# Patient Record
Sex: MALE | Race: WHITE | Employment: UNEMPLOYED | URBAN - METROPOLITAN AREA
[De-identification: names, ages, dates, MRNs, and addresses within clinical notes are randomized per-mention and may not be internally consistent; named-entity substitution may affect disease eponyms.]

---

## 2021-03-01 ENCOUNTER — HOSPITAL ENCOUNTER (EMERGENCY)
Age: 26
Discharge: HOME | End: 2021-03-01
Payer: MEDICAID

## 2021-03-01 ENCOUNTER — APPOINTMENT (OUTPATIENT)
Dept: GENERAL RADIOLOGY | Age: 26
End: 2021-03-01
Payer: MEDICAID

## 2021-03-01 VITALS
OXYGEN SATURATION: 97 % | HEART RATE: 95 BPM | WEIGHT: 170.86 LBS | BODY MASS INDEX: 23.14 KG/M2 | DIASTOLIC BLOOD PRESSURE: 67 MMHG | SYSTOLIC BLOOD PRESSURE: 101 MMHG | TEMPERATURE: 98.2 F | RESPIRATION RATE: 16 BRPM | HEIGHT: 72 IN

## 2021-03-01 ASSESSMENT — PAIN DESCRIPTION - ORIENTATION: ORIENTATION: LEFT

## 2021-03-01 ASSESSMENT — PAIN SCALES - GENERAL: PAINLEVEL_OUTOF10: 8

## 2021-03-01 ASSESSMENT — PAIN DESCRIPTION - PAIN TYPE: TYPE: ACUTE PAIN

## 2021-03-01 ASSESSMENT — PAIN DESCRIPTION - LOCATION: LOCATION: HAND

## 2021-06-30 ENCOUNTER — HOSPITAL ENCOUNTER (INPATIENT)
Age: 26
LOS: 1 days | Discharge: HOME | DRG: 460 | End: 2021-07-02
Attending: EMERGENCY MEDICINE | Admitting: STUDENT IN AN ORGANIZED HEALTH CARE EDUCATION/TRAINING PROGRAM
Payer: MEDICAID

## 2021-06-30 ENCOUNTER — APPOINTMENT (OUTPATIENT)
Dept: CT IMAGING | Age: 26
DRG: 460 | End: 2021-06-30
Payer: MEDICAID

## 2021-06-30 ENCOUNTER — APPOINTMENT (OUTPATIENT)
Dept: GENERAL RADIOLOGY | Age: 26
DRG: 460 | End: 2021-06-30
Payer: MEDICAID

## 2021-06-30 DIAGNOSIS — E86.0 DEHYDRATION: ICD-10-CM

## 2021-06-30 DIAGNOSIS — R11.2 NON-INTRACTABLE VOMITING WITH NAUSEA, UNSPECIFIED VOMITING TYPE: ICD-10-CM

## 2021-06-30 DIAGNOSIS — N28.9 ACUTE RENAL INSUFFICIENCY: Primary | ICD-10-CM

## 2021-06-30 LAB
BASOPHILS ABSOLUTE: 0.1 K/UL (ref 0–0.2)
BASOPHILS RELATIVE PERCENT: 0.5 %
EOSINOPHILS ABSOLUTE: 0.1 K/UL (ref 0–0.6)
EOSINOPHILS RELATIVE PERCENT: 0.4 %
HCT VFR BLD CALC: 52.7 % (ref 40.5–52.5)
HEMOGLOBIN: 18.4 G/DL (ref 13.5–17.5)
LYMPHOCYTES ABSOLUTE: 1.4 K/UL (ref 1–5.1)
LYMPHOCYTES RELATIVE PERCENT: 6 %
MCH RBC QN AUTO: 27.6 PG (ref 26–34)
MCHC RBC AUTO-ENTMCNC: 34.9 G/DL (ref 31–36)
MCV RBC AUTO: 78.9 FL (ref 80–100)
MONOCYTES ABSOLUTE: 1.2 K/UL (ref 0–1.3)
MONOCYTES RELATIVE PERCENT: 5.2 %
NEUTROPHILS ABSOLUTE: 20.3 K/UL (ref 1.7–7.7)
NEUTROPHILS RELATIVE PERCENT: 87.9 %
PDW BLD-RTO: 14.1 % (ref 12.4–15.4)
PLATELET # BLD: 506 K/UL (ref 135–450)
PMV BLD AUTO: 8.6 FL (ref 5–10.5)
RBC # BLD: 6.68 M/UL (ref 4.2–5.9)
TROPONIN: <0.01 NG/ML
WBC # BLD: 23.1 K/UL (ref 4–11)

## 2021-06-30 PROCEDURE — 71045 X-RAY EXAM CHEST 1 VIEW: CPT

## 2021-06-30 PROCEDURE — 85025 COMPLETE CBC W/AUTO DIFF WBC: CPT

## 2021-06-30 PROCEDURE — 84484 ASSAY OF TROPONIN QUANT: CPT

## 2021-06-30 PROCEDURE — 83690 ASSAY OF LIPASE: CPT

## 2021-06-30 PROCEDURE — 82550 ASSAY OF CK (CPK): CPT

## 2021-06-30 PROCEDURE — 93005 ELECTROCARDIOGRAM TRACING: CPT | Performed by: EMERGENCY MEDICINE

## 2021-06-30 PROCEDURE — 6360000002 HC RX W HCPCS: Performed by: EMERGENCY MEDICINE

## 2021-06-30 PROCEDURE — 83735 ASSAY OF MAGNESIUM: CPT

## 2021-06-30 PROCEDURE — 2580000003 HC RX 258: Performed by: EMERGENCY MEDICINE

## 2021-06-30 PROCEDURE — 81001 URINALYSIS AUTO W/SCOPE: CPT

## 2021-06-30 PROCEDURE — 80053 COMPREHEN METABOLIC PANEL: CPT

## 2021-06-30 PROCEDURE — 96375 TX/PRO/DX INJ NEW DRUG ADDON: CPT

## 2021-06-30 PROCEDURE — 80307 DRUG TEST PRSMV CHEM ANLYZR: CPT

## 2021-06-30 PROCEDURE — 99285 EMERGENCY DEPT VISIT HI MDM: CPT

## 2021-06-30 PROCEDURE — 96361 HYDRATE IV INFUSION ADD-ON: CPT

## 2021-06-30 PROCEDURE — 36415 COLL VENOUS BLD VENIPUNCTURE: CPT

## 2021-06-30 RX ORDER — METOCLOPRAMIDE HYDROCHLORIDE 5 MG/ML
10 INJECTION INTRAMUSCULAR; INTRAVENOUS ONCE
Status: COMPLETED | OUTPATIENT
Start: 2021-07-01 | End: 2021-07-01

## 2021-06-30 RX ORDER — MAGNESIUM SULFATE 1 G/100ML
1000 INJECTION INTRAVENOUS ONCE
Status: COMPLETED | OUTPATIENT
Start: 2021-06-30 | End: 2021-07-01

## 2021-06-30 RX ORDER — 0.9 % SODIUM CHLORIDE 0.9 %
1000 INTRAVENOUS SOLUTION INTRAVENOUS ONCE
Status: COMPLETED | OUTPATIENT
Start: 2021-06-30 | End: 2021-07-01

## 2021-06-30 RX ORDER — ONDANSETRON 2 MG/ML
4 INJECTION INTRAMUSCULAR; INTRAVENOUS ONCE
Status: COMPLETED | OUTPATIENT
Start: 2021-06-30 | End: 2021-06-30

## 2021-06-30 RX ORDER — DIPHENHYDRAMINE HYDROCHLORIDE 50 MG/ML
12.5 INJECTION INTRAMUSCULAR; INTRAVENOUS ONCE
Status: COMPLETED | OUTPATIENT
Start: 2021-07-01 | End: 2021-07-01

## 2021-06-30 RX ADMIN — SODIUM CHLORIDE 1000 ML: 9 INJECTION, SOLUTION INTRAVENOUS at 23:25

## 2021-06-30 RX ADMIN — MAGNESIUM SULFATE HEPTAHYDRATE 1000 MG: 1 INJECTION, SOLUTION INTRAVENOUS at 23:51

## 2021-06-30 RX ADMIN — ONDANSETRON 4 MG: 2 INJECTION INTRAMUSCULAR; INTRAVENOUS at 23:25

## 2021-07-01 ENCOUNTER — APPOINTMENT (OUTPATIENT)
Dept: CT IMAGING | Age: 26
DRG: 460 | End: 2021-07-01
Payer: MEDICAID

## 2021-07-01 PROBLEM — N17.9 ACUTE KIDNEY INJURY (HCC): Status: ACTIVE | Noted: 2021-07-01

## 2021-07-01 LAB
A/G RATIO: 1 (ref 1.1–2.2)
A/G RATIO: 1.1 (ref 1.1–2.2)
ALBUMIN SERPL-MCNC: 4 G/DL (ref 3.4–5)
ALBUMIN SERPL-MCNC: 5.1 G/DL (ref 3.4–5)
ALP BLD-CCNC: 100 U/L (ref 40–129)
ALP BLD-CCNC: 75 U/L (ref 40–129)
ALT SERPL-CCNC: 34 U/L (ref 10–40)
ALT SERPL-CCNC: 45 U/L (ref 10–40)
AMORPHOUS: ABNORMAL /HPF
AMPHETAMINE SCREEN, URINE: POSITIVE
ANION GAP SERPL CALCULATED.3IONS-SCNC: 21 MMOL/L (ref 3–16)
ANION GAP SERPL CALCULATED.3IONS-SCNC: 24 MMOL/L (ref 3–16)
ANION GAP SERPL CALCULATED.3IONS-SCNC: 33 MMOL/L (ref 3–16)
AST SERPL-CCNC: 48 U/L (ref 15–37)
AST SERPL-CCNC: 64 U/L (ref 15–37)
BACTERIA: ABNORMAL /HPF
BARBITURATE SCREEN URINE: ABNORMAL
BASOPHILS ABSOLUTE: 0 K/UL (ref 0–0.2)
BASOPHILS RELATIVE PERCENT: 0.1 %
BENZODIAZEPINE SCREEN, URINE: ABNORMAL
BILIRUB SERPL-MCNC: 0.6 MG/DL (ref 0–1)
BILIRUB SERPL-MCNC: 0.9 MG/DL (ref 0–1)
BILIRUBIN URINE: ABNORMAL
BLOOD, URINE: ABNORMAL
BUN BLDV-MCNC: 77 MG/DL (ref 7–20)
BUN BLDV-MCNC: 79 MG/DL (ref 7–20)
BUN BLDV-MCNC: 80 MG/DL (ref 7–20)
CALCIUM SERPL-MCNC: 10.7 MG/DL (ref 8.3–10.6)
CALCIUM SERPL-MCNC: 8.5 MG/DL (ref 8.3–10.6)
CALCIUM SERPL-MCNC: 8.6 MG/DL (ref 8.3–10.6)
CANNABINOID SCREEN URINE: ABNORMAL
CHLORIDE BLD-SCNC: 68 MMOL/L (ref 99–110)
CHLORIDE BLD-SCNC: 80 MMOL/L (ref 99–110)
CHLORIDE BLD-SCNC: 81 MMOL/L (ref 99–110)
CLARITY: CLEAR
CO2: 25 MMOL/L (ref 21–32)
CO2: 25 MMOL/L (ref 21–32)
CO2: 28 MMOL/L (ref 21–32)
COARSE CASTS, UA: ABNORMAL /LPF (ref 0–2)
COCAINE METABOLITE SCREEN URINE: POSITIVE
COLOR: YELLOW
CREAT SERPL-MCNC: 7.2 MG/DL (ref 0.9–1.3)
CREAT SERPL-MCNC: 7.8 MG/DL (ref 0.9–1.3)
CREAT SERPL-MCNC: 9 MG/DL (ref 0.9–1.3)
EOSINOPHILS ABSOLUTE: 0 K/UL (ref 0–0.6)
EOSINOPHILS RELATIVE PERCENT: 0 %
EPITHELIAL CELLS, UA: ABNORMAL /HPF (ref 0–5)
GFR AFRICAN AMERICAN: 10
GFR AFRICAN AMERICAN: 11
GFR AFRICAN AMERICAN: 9
GFR NON-AFRICAN AMERICAN: 7
GFR NON-AFRICAN AMERICAN: 8
GFR NON-AFRICAN AMERICAN: 9
GLOBULIN: 3.8 G/DL
GLOBULIN: 5.2 G/DL
GLUCOSE BLD-MCNC: 126 MG/DL (ref 70–99)
GLUCOSE BLD-MCNC: 130 MG/DL (ref 70–99)
GLUCOSE BLD-MCNC: 165 MG/DL (ref 70–99)
GLUCOSE URINE: NEGATIVE MG/DL
HCT VFR BLD CALC: 40.5 % (ref 40.5–52.5)
HEMOGLOBIN: 14.6 G/DL (ref 13.5–17.5)
KETONES, URINE: ABNORMAL MG/DL
LEUKOCYTE ESTERASE, URINE: NEGATIVE
LIPASE: 37 U/L (ref 13–60)
LYMPHOCYTES ABSOLUTE: 1.3 K/UL (ref 1–5.1)
LYMPHOCYTES RELATIVE PERCENT: 6.1 %
Lab: ABNORMAL
MAGNESIUM: 2.9 MG/DL (ref 1.8–2.4)
MCH RBC QN AUTO: 28.2 PG (ref 26–34)
MCHC RBC AUTO-ENTMCNC: 35.9 G/DL (ref 31–36)
MCV RBC AUTO: 78.4 FL (ref 80–100)
METHADONE SCREEN, URINE: ABNORMAL
MICROSCOPIC EXAMINATION: YES
MONOCYTES ABSOLUTE: 1.6 K/UL (ref 0–1.3)
MONOCYTES RELATIVE PERCENT: 7.9 %
NEUTROPHILS ABSOLUTE: 17.7 K/UL (ref 1.7–7.7)
NEUTROPHILS RELATIVE PERCENT: 85.9 %
NITRITE, URINE: NEGATIVE
OPIATE SCREEN URINE: ABNORMAL
OXYCODONE URINE: ABNORMAL
PDW BLD-RTO: 13.8 % (ref 12.4–15.4)
PH UA: 5
PH UA: 5 (ref 5–8)
PHENCYCLIDINE SCREEN URINE: ABNORMAL
PLATELET # BLD: 378 K/UL (ref 135–450)
PMV BLD AUTO: 8.5 FL (ref 5–10.5)
POTASSIUM REFLEX MAGNESIUM: 3.5 MMOL/L (ref 3.5–5.1)
POTASSIUM REFLEX MAGNESIUM: 3.8 MMOL/L (ref 3.5–5.1)
POTASSIUM SERPL-SCNC: 3.7 MMOL/L (ref 3.5–5.1)
PROPOXYPHENE SCREEN: ABNORMAL
PROTEIN UA: 30 MG/DL
RBC # BLD: 5.17 M/UL (ref 4.2–5.9)
RBC UA: ABNORMAL /HPF (ref 0–4)
SODIUM BLD-SCNC: 126 MMOL/L (ref 136–145)
SODIUM BLD-SCNC: 129 MMOL/L (ref 136–145)
SODIUM BLD-SCNC: 130 MMOL/L (ref 136–145)
SODIUM URINE: 25 MMOL/L
SPECIFIC GRAVITY UA: >=1.03 (ref 1–1.03)
TOTAL CK: 2701 U/L (ref 39–308)
TOTAL CK: 3376 U/L (ref 39–308)
TOTAL PROTEIN: 10.3 G/DL (ref 6.4–8.2)
TOTAL PROTEIN: 7.8 G/DL (ref 6.4–8.2)
URIC ACID, SERUM: 14.1 MG/DL (ref 3.5–7.2)
URINE TYPE: ABNORMAL
UROBILINOGEN, URINE: 0.2 E.U./DL
WBC # BLD: 20.6 K/UL (ref 4–11)
WBC UA: ABNORMAL /HPF (ref 0–5)

## 2021-07-01 PROCEDURE — 74176 CT ABD & PELVIS W/O CONTRAST: CPT

## 2021-07-01 PROCEDURE — 82550 ASSAY OF CK (CPK): CPT

## 2021-07-01 PROCEDURE — 85025 COMPLETE CBC W/AUTO DIFF WBC: CPT

## 2021-07-01 PROCEDURE — 2580000003 HC RX 258: Performed by: EMERGENCY MEDICINE

## 2021-07-01 PROCEDURE — 1200000000 HC SEMI PRIVATE

## 2021-07-01 PROCEDURE — 94760 N-INVAS EAR/PLS OXIMETRY 1: CPT

## 2021-07-01 PROCEDURE — 83735 ASSAY OF MAGNESIUM: CPT

## 2021-07-01 PROCEDURE — 6370000000 HC RX 637 (ALT 250 FOR IP): Performed by: STUDENT IN AN ORGANIZED HEALTH CARE EDUCATION/TRAINING PROGRAM

## 2021-07-01 PROCEDURE — 6360000002 HC RX W HCPCS: Performed by: STUDENT IN AN ORGANIZED HEALTH CARE EDUCATION/TRAINING PROGRAM

## 2021-07-01 PROCEDURE — 80053 COMPREHEN METABOLIC PANEL: CPT

## 2021-07-01 PROCEDURE — 84300 ASSAY OF URINE SODIUM: CPT

## 2021-07-01 PROCEDURE — 2580000003 HC RX 258: Performed by: HOSPITALIST

## 2021-07-01 PROCEDURE — 6360000002 HC RX W HCPCS: Performed by: EMERGENCY MEDICINE

## 2021-07-01 PROCEDURE — 96365 THER/PROPH/DIAG IV INF INIT: CPT

## 2021-07-01 PROCEDURE — 96361 HYDRATE IV INFUSION ADD-ON: CPT

## 2021-07-01 PROCEDURE — 36415 COLL VENOUS BLD VENIPUNCTURE: CPT

## 2021-07-01 PROCEDURE — 99223 1ST HOSP IP/OBS HIGH 75: CPT | Performed by: HOSPITALIST

## 2021-07-01 PROCEDURE — 96375 TX/PRO/DX INJ NEW DRUG ADDON: CPT

## 2021-07-01 PROCEDURE — 84550 ASSAY OF BLOOD/URIC ACID: CPT

## 2021-07-01 PROCEDURE — 2580000003 HC RX 258: Performed by: STUDENT IN AN ORGANIZED HEALTH CARE EDUCATION/TRAINING PROGRAM

## 2021-07-01 RX ORDER — SODIUM CHLORIDE 9 MG/ML
INJECTION, SOLUTION INTRAVENOUS CONTINUOUS
Status: DISCONTINUED | OUTPATIENT
Start: 2021-07-01 | End: 2021-07-02 | Stop reason: HOSPADM

## 2021-07-01 RX ORDER — SODIUM CHLORIDE 0.9 % (FLUSH) 0.9 %
5-40 SYRINGE (ML) INJECTION PRN
Status: DISCONTINUED | OUTPATIENT
Start: 2021-07-01 | End: 2021-07-02 | Stop reason: HOSPADM

## 2021-07-01 RX ORDER — POTASSIUM CHLORIDE 20 MEQ/1
40 TABLET, EXTENDED RELEASE ORAL ONCE
Status: COMPLETED | OUTPATIENT
Start: 2021-07-01 | End: 2021-07-01

## 2021-07-01 RX ORDER — 0.9 % SODIUM CHLORIDE 0.9 %
1000 INTRAVENOUS SOLUTION INTRAVENOUS ONCE
Status: COMPLETED | OUTPATIENT
Start: 2021-07-01 | End: 2021-07-01

## 2021-07-01 RX ORDER — ONDANSETRON 2 MG/ML
4 INJECTION INTRAMUSCULAR; INTRAVENOUS EVERY 6 HOURS PRN
Status: DISCONTINUED | OUTPATIENT
Start: 2021-07-01 | End: 2021-07-02 | Stop reason: HOSPADM

## 2021-07-01 RX ORDER — SODIUM CHLORIDE 9 MG/ML
1000 INJECTION, SOLUTION INTRAVENOUS CONTINUOUS
Status: DISCONTINUED | OUTPATIENT
Start: 2021-07-01 | End: 2021-07-01

## 2021-07-01 RX ORDER — SODIUM CHLORIDE 0.9 % (FLUSH) 0.9 %
5-40 SYRINGE (ML) INJECTION EVERY 12 HOURS SCHEDULED
Status: DISCONTINUED | OUTPATIENT
Start: 2021-07-01 | End: 2021-07-02 | Stop reason: HOSPADM

## 2021-07-01 RX ORDER — SODIUM CHLORIDE 9 MG/ML
25 INJECTION, SOLUTION INTRAVENOUS PRN
Status: DISCONTINUED | OUTPATIENT
Start: 2021-07-01 | End: 2021-07-02 | Stop reason: HOSPADM

## 2021-07-01 RX ORDER — HEPARIN SODIUM 5000 [USP'U]/ML
5000 INJECTION, SOLUTION INTRAVENOUS; SUBCUTANEOUS EVERY 8 HOURS SCHEDULED
Status: DISCONTINUED | OUTPATIENT
Start: 2021-07-01 | End: 2021-07-02 | Stop reason: HOSPADM

## 2021-07-01 RX ADMIN — METOCLOPRAMIDE 10 MG: 5 INJECTION, SOLUTION INTRAMUSCULAR; INTRAVENOUS at 00:07

## 2021-07-01 RX ADMIN — POTASSIUM CHLORIDE 40 MEQ: 1500 TABLET, EXTENDED RELEASE ORAL at 06:52

## 2021-07-01 RX ADMIN — SODIUM CHLORIDE: 9 INJECTION, SOLUTION INTRAVENOUS at 04:50

## 2021-07-01 RX ADMIN — DIPHENHYDRAMINE HYDROCHLORIDE 12.5 MG: 50 INJECTION, SOLUTION INTRAMUSCULAR; INTRAVENOUS at 00:06

## 2021-07-01 RX ADMIN — SODIUM CHLORIDE 1000 ML: 9 INJECTION, SOLUTION INTRAVENOUS at 00:52

## 2021-07-01 RX ADMIN — SODIUM CHLORIDE: 9 INJECTION, SOLUTION INTRAVENOUS at 18:06

## 2021-07-01 RX ADMIN — HEPARIN SODIUM 5000 UNITS: 5000 INJECTION INTRAVENOUS; SUBCUTANEOUS at 14:15

## 2021-07-01 RX ADMIN — HEPARIN SODIUM 5000 UNITS: 5000 INJECTION INTRAVENOUS; SUBCUTANEOUS at 05:41

## 2021-07-01 RX ADMIN — SODIUM CHLORIDE: 9 INJECTION, SOLUTION INTRAVENOUS at 12:23

## 2021-07-01 RX ADMIN — SODIUM CHLORIDE 1000 ML: 9 INJECTION, SOLUTION INTRAVENOUS at 03:00

## 2021-07-01 RX ADMIN — ONDANSETRON 4 MG: 2 INJECTION INTRAMUSCULAR; INTRAVENOUS at 09:52

## 2021-07-01 RX ADMIN — HEPARIN SODIUM 5000 UNITS: 5000 INJECTION INTRAVENOUS; SUBCUTANEOUS at 21:23

## 2021-07-01 RX ADMIN — SODIUM CHLORIDE 1000 ML: 9 INJECTION, SOLUTION INTRAVENOUS at 02:00

## 2021-07-01 ASSESSMENT — PAIN DESCRIPTION - FREQUENCY: FREQUENCY: CONTINUOUS

## 2021-07-01 ASSESSMENT — PAIN SCALES - GENERAL
PAINLEVEL_OUTOF10: 0
PAINLEVEL_OUTOF10: 8

## 2021-07-01 ASSESSMENT — PAIN DESCRIPTION - PAIN TYPE: TYPE: ACUTE PAIN

## 2021-07-01 ASSESSMENT — PAIN DESCRIPTION - ORIENTATION: ORIENTATION: LOWER;LEFT

## 2021-07-01 ASSESSMENT — PAIN DESCRIPTION - LOCATION: LOCATION: BACK

## 2021-07-01 ASSESSMENT — PAIN DESCRIPTION - DESCRIPTORS: DESCRIPTORS: ACHING

## 2021-07-01 NOTE — CONSULTS
Office: [phone]       Fax: [phone]      Nephrology Initial Consult Note        Patient's Name: Arun Krishnamurthy  Admit Date: 6/30/2021  Date of Visit: 7/1/2021    Reason for Consult: DESTINEE  Requesting Physician:  Alice Puckett MD  PCP: No primary care provider on file. Chief Complaint:  bodyaches      History of Present Illness:      Arun Krishnamurthy is a 32 y.o. male with PMHx of polysub abuse, Hep C who was hospitalized on 6/30/2021 with complaints of weakness, body aches  Having vomiting in last few days  No diarrhea  Works as . Labs: creatinine of 9.0, BUN 77, potassium 3.8, sodium 126, calcium 10.7. Ricki Confer Ricki Confer Total CK 3376,  No hematuria  Reports having kidney failure when he was taking heroin. Ur tox now + for cocaine, amphetamine  NSAID use: Yes, 1-2/wk  IV contrast: None recent   Home meds reviewed and noted to be on naprosyn    Past Medical History:   Diagnosis Date    Alcohol abuse     [facility] [address]     Hep C w/o coma, chronic ([facility]        History reviewed. No pertinent surgical history. History reviewed. No pertinent family history. reports that he has been smoking cigarettes. He has been smoking about 1.00 pack per day. He has never used smokeless tobacco. He reports current alcohol use. He reports that he does not use drugs. Medications:    Allergies:  Vicodin [hydrocodone-acetaminophen]    Scheduled Meds:   sodium chloride flush  5-40 mL Intravenous 2 times per day    heparin (porcine)  5,000 Units Subcutaneous 3 times per day     Continuous Infusions:   sodium chloride      sodium chloride 100 mL/hr at 07/01/21 0450       Labs:  CBC:   Recent Labs     06/30/21 2320 07/01/21  0520   WBC 23.1* 20.6*   HGB 18.4* 14.6   * 378     Ca/Mg/Phos:   Recent Labs     06/30/21 2320 07/01/21  0336 07/01/21  0520 CALCIUM 10.7* 8.6 8.5   MG 2.90* 2.90* 2.90*     UA:  Recent Labs     06/30/21  2350   COLORU Yellow   CLARITYU Clear   GLUCOSEU Negative   BILIRUBINUR SMALL*   KETUA TRACE*   SPECGRAV >=1.030   BLOODU MODERATE*   PHUR 5.0  5.0   PROTEINU 30*   UROBILINOGEN 0.2   NITRU Negative   LEUKOCYTESUR Negative   LABMICR YES   URINETYPE NotGiven      Urine Microscopic:   Recent Labs     06/30/21  2350   BACTERIA 3+*   WBCUA 3-5   RBCUA 5-10*   EPIU 2-5     Urine Chemistry: No results for input(s): CLUR, LABCREA, PROTEINUR, NAUR in the last 72 hours. ROS:     All systems reviewed and negative except as in HPI    Objective:     Vitals: /72   Pulse 86   Temp 98.6 °F (37 °C) (Oral)   Resp 16   Ht 6' (1.829 m)   Wt 161 lb 13.1 oz (73.4 kg)   SpO2 97%   BMI 21.95 kg/m²    Wt Readings from Last 3 Encounters:   07/01/21 161 lb 13.1 oz (73.4 kg)   03/01/21 170 lb 13.7 oz (77.5 kg)   07/10/16 159 lb 13.3 oz (72.5 kg)      24HR INTAKE/OUTPUT:      Intake/Output Summary (Last 24 hours) at 7/1/2021 7848  Last data filed at 7/1/2021 3206  Gross per 24 hour   Intake --   Output 500 ml   Net -500 ml     Constitutional:  awake, NAD  HEENT:  MMM, No icterus  Neck: no bruits, No JVD  Cardiovascular:  reg rhythm  Respiratory: CTA, no crackles  Abdomen:  +BS, soft, NT, ND  Ext: no lower extremity edema  Psychiatric: mood and affect appropriate  Skin: dry/intact  CNS: alert, no agitation    IMAGING:  CT ABDOMEN PELVIS WO CONTRAST Additional Contrast? None   Final Result   No acute finding in the abdomen or pelvis. Dense formed stool load suggests stasis. 1.1 cm left adrenal gland calcification likely related to remote hemorrhage. Mild concentric thickening wall of the distal esophagus. Correlate for   possible reflux disease. XR CHEST PORTABLE   Final Result   Stable chronic changes with no acute abnormality seen. Assessment :     1.  DESTINEE  -Non-Oliguric  -Baseline creat: [place], now 9-->7.2  -UA: conc, prot +, bld +, RBC 5-10  -CK 3300->2700  -abdominal CT scan: kidneys unremarkable. Bladder empty  -Volume: Euvolemic  -Electrolytes: Hyponatremia and No Dyskalemia  -Acid-Base: AGMA and Metabolic alkalosis    Recent Labs     06/30/21 2320 07/01/21  0336 07/01/21  0520   BUN 77* 79* 80*   CREATININE 9.0* 7.8* 7.2*     Recent Labs     06/30/21 2320 07/01/21  0336 07/01/21  0520   * 130* 129*   K 3.8 3.5 3.7   CO2 25 25 28   MG 2.90* 2.90* 2.90*         2. HTN  -Blood pressure at goal     BP Readings from Last 1 Encounters:   07/01/21 135/72       3. Polysubstance abuse    4. Hep C      Plan:     - continue IVF. Inc rate  - UPC  - phos, uric acid  - avoid NSAID (educated)  - get old records, if any  - Monitor BMP    -Monitor I/O, UOP  -Maintain MAP>65  -Avoid nephrotoxin, if able. -Dose meds to current eGFR    Thank you for allowing us to participate in care of [facility] . We will continue to follow. Feel free to contact me with any questions.       Loulou Alonso MD  7/1/2021    [facility] [address]  Office : [phone]  Fax :[phone]

## 2021-07-01 NOTE — PROGRESS NOTES
Raudel Humphreys MD perfect served  7/1/21 4:08 PM   [phone] Hospital or Facility: [facility] From: Michelle Whitmore RE: Dennis Garcia 1995 RM: 5250/01 Family concerned about pt going through withdrawal d/t increased nausea.  He has problems swallowing d/t increased throwing up Need Callback: NO CALLBACK REQ 5N [phone]

## 2021-07-01 NOTE — CARE COORDINATION
INITIAL CASE MANAGEMENT ASSESSMENT    Reviewed chart, met with patient to assess possible discharge needs. Explained Case Management role/services. Living Situation: Confirmed address, lives with parents, 2 story house, 2 steps to enter    ADLs: Independent     DME: None    PT/OT Recs: Not ordered     Active Services: None     Transportation: Active  - family transport     Medications: Uses [address] in [place]    PCP: No PCP -- patient given [address] PCP list and [facility] for primary care      HD/PD: n/a    PLAN/COMMENTS: Patient wants inpatient drug treatment. Patient given resources to find inpatient treatment, says he's been to [facility] in past. Spoke to [facility] they don't accept [place]. Patients says he's been through rehab process before and denies needing additional assistance. CM provided contact information for patient or family to call with any questions. CM will follow and assist as needed.   Electronically signed by Michael Solares RN Case Management [phone] on 7/1/2021 at 10:52 AM

## 2021-07-01 NOTE — PLAN OF CARE
Problem: Falls - Risk of:  Goal: Will remain free from falls  Description: Will remain free from falls  7/1/2021 1139 by Rohini Yanes RN  Outcome: Ongoing  7/1/2021 0529 by Mitchell Arellano RN  Outcome: Ongoing  Goal: Absence of physical injury  Description: Absence of physical injury  7/1/2021 1139 by Rohini Yanes RN  Outcome: Ongoing  7/1/2021 0529 by iMtchell Arellano RN  Outcome: Ongoing     Problem: Infection:  Goal: Will remain free from infection  Description: Will remain free from infection  7/1/2021 1139 by Rohini Yanes RN  Outcome: Ongoing  7/1/2021 0529 by Mitchell Arellano RN  Outcome: Ongoing     Problem: Daily Care:  Goal: Daily care needs are met  Description: Daily care needs are met  7/1/2021 1139 by Rohini Yanes RN  Outcome: Ongoing  7/1/2021 0529 by Mitchell Arellano RN  Outcome: Ongoing     Problem: Pain:  Goal: Patient's pain/discomfort is manageable  Description: Patient's pain/discomfort is manageable  7/1/2021 1139 by Rohini Yanes RN  Outcome: Ongoing  7/1/2021 0529 by Mitchell Arellano RN  Outcome: Ongoing     Problem: Discharge Planning:  Goal: Patients continuum of care needs are met  Description: Patients continuum of care needs are met  7/1/2021 1139 by Rohini Yanes RN  Outcome: Ongoing  7/1/2021 0529 by Mitchell Arellano RN  Outcome: Ongoing

## 2021-07-01 NOTE — ED PROVIDER NOTES
[facility] ENCOUNTER      Pt Name: Genia Cranker  MRN: [id number]  [place] 1995  Date of evaluation: 6/30/2021  Provider: Shruti Felder MD    CHIEF COMPLAINT     Per nursing:   Chief Complaint   Patient presents with    Emesis     Pt states he stopped using IV narcotics 1 week ago he started a new job as a  and has had N/V and back pain since. Per my evaluation: Nausea vomiting    HISTORY OF PRESENT ILLNESS   (Location/Symptom, Timing/Onset,Context/Setting, Quality, Duration, Modifying Factors, Severity)  Note limiting factors. Genia Cranker is a 32 y.o. male who presents to the emergency department for evaluation of nausea and vomiting for the past 2 days. Patient states that he did recently discontinue heroin abuse and is concerned this may be related. He denies any fevers or chills. Patient states that he has been unable to keep any thing down for the past 2 days. He does endorse diffuse body aches. He states he does have a history of kidney issues and is concerned that may be causing his symptoms. Patient reports some chest as well as lower back pain, which he says are nonradiating. He denies any exacerbating or alleviating factors. He denies any significant abdominal pain. Patient does endorse some shortness of breath as well. Patient's girlfriend does report that several days ago he was working in the heat [place], and was sent home by his manager because he became extremely diaphoretic and had muscle aches. NursingNotes were reviewed. REVIEW OF SYSTEMS    (2-9 systems for level 4, 10 or more for level 5)       Constitutional: No fever or chills. Eye: No visual disturbances. No eye pain. Ear/Nose/Mouth/Throat: No nasal congestion. No sore throat. Respiratory: No cough, shortness of breath, No sputum production. Cardiovascular: Chest pain. No palpitations. Gastrointestinal: No abdominal pain.   Nausea and vomiting  Genitourinary: No dysuria. No hematuria. Hematology/Lymphatics: No bleeding or bruising tendency. Immunologic: No malaise. No swollen glands. Musculoskeletal: Lower back pain. No joint pain. Integumentary: No rash. No abrasions. Neurologic: No headache. No focal numbness or weakness. PAST MEDICAL HISTORY     Past Medical History:   Diagnosis Date    Alcohol abuse     Drug abuse     Hep C w/o coma, chronic (HCC)          SURGICALHISTORY     No past surgical history on file. CURRENT MEDICATIONS       Previous Medications    No medications on file       ALLERGIES     Vicodin [hydrocodone-acetaminophen]    FAMILY HISTORY     No family history on file. SOCIAL HISTORY       Social History     Socioeconomic History    Marital status: Single     Spouse name: Not on file    Number of children: Not on file    Years of education: Not on file    Highest education level: Not on file   Occupational History    Not on file   Tobacco Use    Smoking status: Current Every Day Smoker     Packs/day: 1.00     Types: Cigarettes   Substance and Sexual Activity    Alcohol use: Yes     Comment: weekly,  couple times, whiskey    Drug use: No     Types: IV, Cocaine, Marijuana, Methamphetamines     Comment: past heroin meth crack marijuana - last drug use february 2016    Sexual activity: Yes     Partners: Female   Other Topics Concern    Not on file   Social History Narrative    Not on file     Social Determinants of Health     Financial Resource Strain:     Difficulty of Paying Living Expenses:    Food Insecurity:     Worried About Running Out of Food in the Last Year:     [address] in the Last Year:    Transportation Needs:     Lack of Transportation (Medical):      Lack of Transportation (Non-Medical):    Physical Activity:     Days of Exercise per Week:     Minutes of Exercise per Session:    Stress:     Feeling of Stress :    Social Connections:     Frequency of Communication with Friends and Family:     Frequency of Social Gatherings with Friends and Family:     Attends Denominational Services:     Active Member of Clubs or Organizations:     Attends Club or Organization Meetings:     Marital Status:    Intimate Partner Violence:     Fear of Current or Ex-Partner:     Emotionally Abused:     Physically Abused:     Sexually Abused:        SCREENINGS             PHYSICAL EXAM    (up to 7 for level 4, 8 or more for level 5)     ED Triage Vitals   BP Temp Temp src Pulse Resp SpO2 Height Weight   -- -- -- -- -- -- -- --       General: Alert and oriented, No acute distress. Eye: Normal conjunctiva. Pupils equal and reactive. HENT: Oral mucosa is moist.  Respiratory: Lungs are clear to auscultation, Respirations are non-labored. Cardiovascular: Mildly tachycardic, Regular rhythm. Gastrointestinal: Soft, Non-distended. Tenderness to palpation in the left lower quadrant  Musculoskeletal: No midline spinal tenderness or step-off throughout the spine, there is no reproducible back tenderness. Strength to plantar dorsiflexion is 5 out of 5 bilaterally, sensation intact throughout the lower extremities. Integumentary: Warm, Dry. Neurologic: Alert, Oriented, No focal defects. Psychiatric: Cooperative.     DIAGNOSTIC RESULTS     EKG: All EKG's are interpreted by the Emergency Department Physician who either signs or Co-signsthis chart in the absence of a cardiologist.    Per my interpretation:    Electrocardiogram (ECG) 6/30/2021 2254  RATE: 108 bpm  RHYTHM: normal sinus  AXIS: normal  INTERVALS:  ms  ST-T WAVE CHANGES: No evidence of ST segment elevation or T-wave inversion  Prior for comparison -none    RADIOLOGY:   Non-plain filmimages such as CT, Ultrasound and MRI are read by the radiologist. Plain radiographic images are visualized and preliminarily interpreted by the emergency physician with the below findings:      Interpretation per the Radiologist below, if available at the time ofthis note:    CT ABDOMEN PELVIS WO CONTRAST Additional Contrast? None   Final Result   No acute finding in the abdomen or pelvis. Dense formed stool load suggests stasis. 1.1 cm left adrenal gland calcification likely related to remote hemorrhage. Mild concentric thickening wall of the distal esophagus. Correlate for   possible reflux disease. XR CHEST PORTABLE   Final Result   Stable chronic changes with no acute abnormality seen.                ED BEDSIDE ULTRASOUND:   Performed by ED Physician - none    LABS:  Labs Reviewed   CBC WITH AUTO DIFFERENTIAL - Abnormal; Notable for the following components:       Result Value    WBC 23.1 (*)     RBC 6.68 (*)     Hemoglobin 18.4 (*)     Hematocrit 52.7 (*)     MCV 78.9 (*)     Platelets 932 (*)     Neutrophils Absolute 20.3 (*)     All other components within normal limits    Narrative:     Performed at:  [facility]  [address] [facility]   Phone [phone]   COMPREHENSIVE METABOLIC PANEL W/ REFLEX TO MG FOR LOW K - Abnormal; Notable for the following components:    Sodium 126 (*)     Chloride 68 (*)     Anion Gap 33 (*)     Glucose 165 (*)     BUN 77 (*)     CREATININE 9.0 (*)     GFR Non- 7 (*)     GFR  9 (*)     Calcium 10.7 (*)     Total Protein 10.3 (*)     Albumin 5.1 (*)     Albumin/Globulin Ratio 1.0 (*)     ALT 45 (*)     AST 64 (*)     All other components within normal limits    Narrative:     CALL  [facility] tel. [phone],  Panic results handed to kehinde alvarez rn in er, 07/01/2021 00:07, by ESTRELLA  Performed at:  [facility]  [address] [facility]   Phone [phone]   URINALYSIS - Abnormal; Notable for the following components:    Bilirubin Urine SMALL (*)     Ketones, Urine TRACE (*)     Blood, Urine MODERATE (*)     Protein, UA 30 (*)     All other components within normal limits    Narrative:     Performed at:  [facility]  [address] [place] [place]   Phone [phone]   [address] - Abnormal; Notable for the following components:    Amphetamine Screen, Urine POSITIVE (*)     Cocaine Metabolite Screen, Urine POSITIVE (*)     All other components within normal limits    Narrative:     Performed at:  [facility]  [address] [place]   Phone [phone]   CK - Abnormal; Notable for the following components:     Total CK 3,376 (*)     All other components within normal limits    Narrative:     Performed at:  [facility]  [address] [place] [place]   Phone [phone]   MICROSCOPIC URINALYSIS - Abnormal; Notable for the following components:    RBC, UA 5-10 (*)     Bacteria, UA 3+ (*)     All other components within normal limits    Narrative:     Performed at:  [facility]  [address] [place]   Phone [phone]   MAGNESIUM - Abnormal; Notable for the following components:    Magnesium 2.90 (*)     All other components within normal limits    Narrative:     Performed at:  [facility]  [address] [place]   Phone [phone]   LIPASE    Narrative:     [facility] tel. [phone],  Panic [facility] in er, 07/01/2021 00:07, by Celso Babinski  Performed at:  [facility]  [address] [place] [place]   Phone [phone]   TROPONIN    Narrative:     Performed at:  [facility]  [address] [place] [place], [place]   Phone [phone]   CK   COMPREHENSIVE METABOLIC PANEL W/ REFLEX TO MG FOR LOW K           EMERGENCY DEPARTMENT COURSE and DIFFERENTIAL DIAGNOSIS/MDM:   Vitals:    Vitals:    06/30/21 2258 06/30/21 2330 07/01/21 0120   BP: (!) 132/96 (!) 130/92 136/88   Pulse: 117  88   Resp: 23 20 15   Temp: 97.6 °F (36.4 °C) 97.8 °F (36.6 °C)    TempSrc: Oral Infrared    SpO2: 97% 98% 97%   Weight: 157 lb 6.5 oz (71.4 kg)           Medical decision making: This is a 80-year-old male who presents for evaluation of several days of nausea, vomiting, inability to tolerate p.o., muscle aches. Of note, patient recently discontinued heroin use, so withdrawal is possibly contributing, patient's girlfriend also reports that symptoms worsened after he was working in the heat, [employer] and has had nausea vomiting since then. On exam, he is tachycardic, but afebrile, with otherwise normal vital signs. He did endorse some lower back pain, but there is no reproducible spinal tenderness, he has a normal neurologic exam of the bilateral lower extremities, therefore I have low suspicion for acute osteomyelitis, spinal epidural abscess at this time. Patient was noted to have some left lower quadrant abdominal tenderness, therefore considered intra-abdominal infection such as appendicitis diverticulitis. EKG shows no evidence of ischemia however QTC was prolonged at 541 ms, therefore patient was empirically treated with magnesium. CBC does show leukocytosis of 23, although appears hemoconcentrated with hemoglobin of 18, platelets of 108. CPK was elevated at 3300. Urinalysis does show moderate blood and only 5-10 WBCs, concerning for myoglobinuria. CMP does show creatinine of 9.0, with BUN of 77, potassium is normal.  There is mild elevated transaminitis I suspect related to dehydration. Sodium also low at 126, and chloride 68, again I suspect related to hypovolemia. Patient received 2 L of normal saline in the emergency department was started on maintenance fluid.   He was also treated with Zofran, Reglan, and Benadryl and had adequate control of his nausea and vomiting. We did obtain a CT of the abdomen pelvis which shows no acute intra-abdominal process. Records were reviewed, however no baseline creatinine could be established and patient does not know what this might be. Patient will be admitted for further management of hypokalemia, acute renal insufficiency. Discussed with hospitalist at [facility], Dr. Jasper Lorenz, who accepts the patient for admission to [facility].  Patient is agreeable with plan of care as well, stable at time of transport. CRITICAL CARE TIME   Total Critical Care time was 0 minutes, excluding separately reportable procedures. There was a high probability of clinically significant/life threatening deterioration in the patient's condition which required my urgent intervention. CONSULTS:  None    PROCEDURES:  Unless otherwise noted below, none         FINAL IMPRESSION      1. Acute renal insufficiency    2. Dehydration    3. Non-intractable vomiting with nausea, unspecified vomiting type          DISPOSITION/PLAN   DISPOSITION Admitted 07/01/2021 01:44:13 AM      PATIENT REFERRED TO:  No follow-up provider specified.     DISCHARGE MEDICATIONS:  New Prescriptions    No medications on file          (Please note that portions of this note were completed with a voice recognition program.Efforts were made to edit the dictations but occasionally words are mis-transcribed.)    Jose A Swan MD (electronically signed)  Attending Emergency Physician        Jose A Swan MD  07/01/21 6571

## 2021-07-01 NOTE — CONSULTS
Consult received  Full note to follow    Hakeem Graham MD  7/1/2021    [facility] [address]  Office : [phone]  Fax :[phone]

## 2021-07-01 NOTE — PLAN OF CARE
Problem: Falls - Risk of:  Goal: Will remain free from falls  Description: Will remain free from falls  Outcome: Ongoing  Goal: Absence of physical injury  Description: Absence of physical injury  Outcome: Ongoing     Problem: Infection:  Goal: Will remain free from infection  Description: Will remain free from infection  Outcome: Ongoing     Problem: Daily Care:  Goal: Daily care needs are met  Description: Daily care needs are met  Outcome: Ongoing     Problem: Pain:  Goal: Patient's pain/discomfort is manageable  Description: Patient's pain/discomfort is manageable  Outcome: Ongoing     Problem: Discharge Planning:  Goal: Patients continuum of care needs are met  Description: Patients continuum of care needs are met  Outcome: Ongoing

## 2021-07-01 NOTE — H&P
Hospital Medicine History & Physical      PCP: No primary care provider on file. Date of Admission: 6/30/2021    Date of Service: Pt seen/examined on 6/30/21 and Admitted to Inpatient with expected LOS greater than two midnights due to medical therapy. Chief Complaint: Nausea, vomiting, myalgias      History Of Present Illness:    32 y.o. male with history of drug abuse, chronic hep C presented to emergency room with nausea and vomiting for the past 3 to 4 days. .     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 .     The past few days has been having nausea and vomiting, generalized aches . Sherie Mccracken He also had some right lower back pain, chest pain. . No shortness of breath, fevers or chills. No dysuria frequency. No diarrhea or abdominal pain. .. He has not been eating or drinking much. In the emergency room, vital signs showed a BP of 132/96, pulse 117, respirations 23, temperature 36.4. Sherie Almendarezeryn Sherie Nawaf CT abdomen showed no acute findings, dense formed stool load suggest stasis, 1.1 cm left adrenal gland calcification likely related to remote hemorrhage, mild thickening of the distal esophagus -possible reflux     Patient however had significantly abnormal labs with a creatinine of 9.0, BUN 77, potassium 3.8, sodium 126, calcium 10.7. Sherie Mccracken Total CK 3376, AST 45, ALT 64. .. Lipase 37. Sherie Mccracken Urine drug screen was positive for amphetamines and cocaine, negative for opiates. . Chest x-ray was clear. .. UA showed evidence of microscopic hematuria    Past Medical History:          Diagnosis Date    Alcohol abuse     [facility] ([facility] [facility]     Hep C w/o coma, chronic ([facility] [address] [facility]        Past Surgical History:      History reviewed. No pertinent surgical history.     Medications Prior to Admission:      Prior to Admission medications    Not on File       Allergies:  Vicodin [hydrocodone-acetaminophen]    Social History:      The patient currently lives TOBACCO:   reports that he has been smoking cigarettes. He has been smoking about 1.00 pack per day. He has never used smokeless tobacco.  ETOH:   reports current alcohol use. Family History:       Reviewed in detail and negative for DM, CAD, Cancer, CVA. Positive as follows:    History reviewed. No pertinent family history. REVIEW OF SYSTEMS:   Pertinent positives as noted in the HPI. All other systems reviewed and negative. PHYSICAL EXAM:    /62   Pulse 69   Temp 97.8 °F (36.6 °C) (Oral)   Resp 16   Ht 6' (1.829 m)   Wt 161 lb 13.1 oz (73.4 kg)   SpO2 97%   BMI 21.95 kg/m²     General appearance:  No apparent distress, appears stated age and cooperative. HEENT:  Normal cephalic, atraumatic without obvious deformity. Pupils equal, round, and reactive to light. Extra ocular muscles intact. Conjunctivae/corneas clear. Neck: Supple, with full range of motion. No jugular venous distention. Trachea midline. Respiratory:  Normal respiratory effort. Clear to auscultation, bilaterally without Rales/Wheezes/Rhonchi. Cardiovascular:  Regular rate and rhythm with normal S1/S2 without murmurs, rubs or gallops. Abdomen: Soft, non-tender, non-distended with normal bowel sounds. Musculoskeletal:  No clubbing, cyanosis or edema bilaterally. Full range of motion without deformity. Skin: Skin color, texture, turgor normal.  No rashes or lesions. Neurologic:  Neurovascularly intact without any focal sensory/motor deficits.  Cranial nerves: II-XII intact, grossly non-focal.  Psychiatric:  Alert and oriented, thought content appropriate, normal insight  Capillary Refill: Brisk,< 3 seconds   Peripheral Pulses: +2 palpable, equal bilaterally       CXR:  I have reviewed the CXR with the following interpretation:   EKG:  I have reviewed the EKG with the following interpretation:     Labs:     Recent Labs     06/30/21  2320 07/01/21  0520   WBC 23.1* 20.6*   HGB 18.4* 14.6   HCT 52.7* 40.5   * 378 Recent Labs     06/30/21 2320 07/01/21 0336 07/01/21  0520   * 130* 129*   K 3.8 3.5 3.7   CL 68* 81* 80*   CO2 25 25 28   BUN 77* 79* 80*   CREATININE 9.0* 7.8* 7.2*   CALCIUM 10.7* 8.6 8.5     Recent Labs     06/30/21 2320 07/01/21  0336   AST 64* 48*   ALT 45* 34   BILITOT 0.9 0.6   ALKPHOS 100 75     No results for input(s): INR in the last 72 hours. Recent Labs     06/30/21 2320 07/01/21  0336   CKTOTAL 3,376* 2,701*   TROPONINI <0.01  --        Urinalysis:      Lab Results   Component Value Date    NITRU Negative 06/30/2021    WBCUA 3-5 06/30/2021    BACTERIA 3+ 06/30/2021    RBCUA 5-10 06/30/2021    BLOODU MODERATE 06/30/2021    SPECGRAV >=1.030 06/30/2021    GLUCOSEU Negative 06/30/2021         ASSESSMENT:    -Acute kidney injury. . Likely prerenal due to N/V/dehydration plus rhabdomyolysis with ATN. ..     -Acute nontraumatic rhabdomyolysis. . CK 3376. . related to meth and cocaine    -Nausea, vomiting, myalgias related to substance abuse/withdrawal syndrome    -Polysubstance abuse--endorses IV heroin use but urine drug screen is also positive for amphetamines and cocaine    -Hyponatremia-hypovolemic    -Reactive leukocytosis and thrombocytosis-no obvious infection at the moment we will closely monitor    -hx Chronic hep C--unknown treatment history of current status-need outpatient follow-up      PLAN:    Aggressive IV fluid resuscitation. .. Continue normal saline at 150 cc/h  Trend CK  Closely monitor renal function and electrolytes  Consulted nephrologist  Symptomatic management of drug withdrawal syndrome  Referral to drug rehab on discharge  Screen for HIV      DVT Prophylaxis: sc heparin  Diet: ADULT DIET; Regular  Code Status: Full Code           Chase Goodman MD    Thank you No primary care provider on file. for the opportunity to be involved in this patient's care. If you have any questions or concerns please feel free to contact me at [phone].

## 2021-07-01 NOTE — ED NOTES
Lab called with critical result      **CRITICAL VALUE**    Creat  9.0        Dr. Kristy Ibarra notified         Maggie Marcos, RN  07/01/21 1200 [facility], RN  07/01/21 4466

## 2021-07-01 NOTE — PROGRESS NOTES
Pt arrived via stretcher from ED to room 5250  Heart monitor connected and verified with CMU patient in NSR. VS, assessment, and admission complete. 4 eyes assessment complete. Pt oriented to unit and room. Call light and bedside table in reach. All questions answered. Pt resting quietly in bed with no complaints or voiced needs at this time. Will continue to monitor.

## 2021-07-01 NOTE — ED NOTES
Lab called with critical values:    **CRITICAL VALUES**    Creat 7.8  CK 2,707    Dr. Lennie Corrales notified       Mariusz Perez RN  07/01/21 4969

## 2021-07-01 NOTE — PROGRESS NOTES
4 Eyes Skin Assessment     NAME:  Dennis Garcia  YOB: 1995  MEDICAL RECORD NUMBER:  [id number]    The patient is being assess for  Admission    I agree that 2 RN's have performed a thorough Head to Toe Skin Assessment on the patient. ALL assessment sites listed below have been assessed. Areas assessed by both nurses:    Head, Face, Ears, Shoulders, Back, Chest, Arms, Elbows, Hands, Sacrum. Buttock, Coccyx, Ischium and Legs. Feet and Heels        Does the Patient have a Wound?  No noted wound(s)       Mario Prevention initiated:  No   Wound Care Orders initiated:  No    Pressure Injury (Stage 3,4, Unstageable, DTI, NWPT, and Complex wounds) if present place consult order under [de-identified] No    New and Established Ostomies if present place consult order under : No      Nurse 1 eSignature: Electronically signed by Denese Lesches, RN on 7/1/21 at 5:12 AM EDT    **SHARE this note so that the co-signing nurse is able to place an eSignature**    Nurse 2 eSignature: Electronically signed by Marge Castle RN on 7/1/21 at 5:13 AM EDT

## 2021-07-02 VITALS
RESPIRATION RATE: 16 BRPM | WEIGHT: 164.02 LBS | DIASTOLIC BLOOD PRESSURE: 70 MMHG | HEIGHT: 72 IN | HEART RATE: 60 BPM | TEMPERATURE: 97.9 F | BODY MASS INDEX: 22.22 KG/M2 | OXYGEN SATURATION: 96 % | SYSTOLIC BLOOD PRESSURE: 141 MMHG

## 2021-07-02 LAB
A/G RATIO: 1.1 (ref 1.1–2.2)
ALBUMIN SERPL-MCNC: 3.4 G/DL (ref 3.4–5)
ALP BLD-CCNC: 64 U/L (ref 40–129)
ALT SERPL-CCNC: 32 U/L (ref 10–40)
ANION GAP SERPL CALCULATED.3IONS-SCNC: 9 MMOL/L (ref 3–16)
AST SERPL-CCNC: 68 U/L (ref 15–37)
BASOPHILS ABSOLUTE: 0 K/UL (ref 0–0.2)
BASOPHILS RELATIVE PERCENT: 0.4 %
BILIRUB SERPL-MCNC: 0.7 MG/DL (ref 0–1)
BUN BLDV-MCNC: 40 MG/DL (ref 7–20)
CALCIUM SERPL-MCNC: 8.2 MG/DL (ref 8.3–10.6)
CHLORIDE BLD-SCNC: 95 MMOL/L (ref 99–110)
CO2: 31 MMOL/L (ref 21–32)
CREAT SERPL-MCNC: 1.4 MG/DL (ref 0.9–1.3)
EKG ATRIAL RATE: 108 BPM
EKG DIAGNOSIS: NORMAL
EKG P AXIS: 81 DEGREES
EKG P-R INTERVAL: 130 MS
EKG Q-T INTERVAL: 404 MS
EKG QRS DURATION: 92 MS
EKG QTC CALCULATION (BAZETT): 541 MS
EKG R AXIS: 70 DEGREES
EKG T AXIS: 61 DEGREES
EKG VENTRICULAR RATE: 108 BPM
EOSINOPHILS ABSOLUTE: 0.1 K/UL (ref 0–0.6)
EOSINOPHILS RELATIVE PERCENT: 0.7 %
GFR AFRICAN AMERICAN: >60
GFR NON-AFRICAN AMERICAN: >60
GLOBULIN: 3.2 G/DL
GLUCOSE BLD-MCNC: 96 MG/DL (ref 70–99)
HCT VFR BLD CALC: 37.9 % (ref 40.5–52.5)
HEMOGLOBIN: 13.4 G/DL (ref 13.5–17.5)
LYMPHOCYTES ABSOLUTE: 2.2 K/UL (ref 1–5.1)
LYMPHOCYTES RELATIVE PERCENT: 26.4 %
MAGNESIUM: 2.5 MG/DL (ref 1.8–2.4)
MCH RBC QN AUTO: 28.2 PG (ref 26–34)
MCHC RBC AUTO-ENTMCNC: 35.2 G/DL (ref 31–36)
MCV RBC AUTO: 80.1 FL (ref 80–100)
MONOCYTES ABSOLUTE: 0.9 K/UL (ref 0–1.3)
MONOCYTES RELATIVE PERCENT: 11.3 %
NEUTROPHILS ABSOLUTE: 5.1 K/UL (ref 1.7–7.7)
NEUTROPHILS RELATIVE PERCENT: 61.2 %
PDW BLD-RTO: 13.7 % (ref 12.4–15.4)
PHOSPHORUS: 1.6 MG/DL (ref 2.5–4.9)
PLATELET # BLD: 278 K/UL (ref 135–450)
PMV BLD AUTO: 7.7 FL (ref 5–10.5)
POTASSIUM SERPL-SCNC: 3.1 MMOL/L (ref 3.5–5.1)
RBC # BLD: 4.74 M/UL (ref 4.2–5.9)
SODIUM BLD-SCNC: 135 MMOL/L (ref 136–145)
TOTAL CK: 2510 U/L (ref 39–308)
TOTAL PROTEIN: 6.6 G/DL (ref 6.4–8.2)
WBC # BLD: 8.3 K/UL (ref 4–11)

## 2021-07-02 PROCEDURE — 97530 THERAPEUTIC ACTIVITIES: CPT

## 2021-07-02 PROCEDURE — 94760 N-INVAS EAR/PLS OXIMETRY 1: CPT

## 2021-07-02 PROCEDURE — 99233 SBSQ HOSP IP/OBS HIGH 50: CPT | Performed by: HOSPITALIST

## 2021-07-02 PROCEDURE — 83735 ASSAY OF MAGNESIUM: CPT

## 2021-07-02 PROCEDURE — 97161 PT EVAL LOW COMPLEX 20 MIN: CPT

## 2021-07-02 PROCEDURE — 85025 COMPLETE CBC W/AUTO DIFF WBC: CPT

## 2021-07-02 PROCEDURE — 80053 COMPREHEN METABOLIC PANEL: CPT

## 2021-07-02 PROCEDURE — 2580000003 HC RX 258: Performed by: HOSPITALIST

## 2021-07-02 PROCEDURE — 6370000000 HC RX 637 (ALT 250 FOR IP): Performed by: HOSPITALIST

## 2021-07-02 PROCEDURE — 84100 ASSAY OF PHOSPHORUS: CPT

## 2021-07-02 PROCEDURE — 82550 ASSAY OF CK (CPK): CPT

## 2021-07-02 PROCEDURE — 6370000000 HC RX 637 (ALT 250 FOR IP): Performed by: PHYSICIAN ASSISTANT

## 2021-07-02 PROCEDURE — 36415 COLL VENOUS BLD VENIPUNCTURE: CPT

## 2021-07-02 PROCEDURE — 93010 ELECTROCARDIOGRAM REPORT: CPT | Performed by: INTERNAL MEDICINE

## 2021-07-02 RX ORDER — POTASSIUM CHLORIDE 20 MEQ/1
40 TABLET, EXTENDED RELEASE ORAL ONCE
Status: COMPLETED | OUTPATIENT
Start: 2021-07-02 | End: 2021-07-02

## 2021-07-02 RX ORDER — PANTOPRAZOLE SODIUM 40 MG/1
40 TABLET, DELAYED RELEASE ORAL
Status: DISCONTINUED | OUTPATIENT
Start: 2021-07-02 | End: 2021-07-02 | Stop reason: HOSPADM

## 2021-07-02 RX ORDER — PANTOPRAZOLE SODIUM 40 MG/1
40 TABLET, DELAYED RELEASE ORAL
Qty: 30 TABLET | Refills: 0 | Status: SHIPPED | OUTPATIENT
Start: 2021-07-03

## 2021-07-02 RX ORDER — SUCRALFATE 1 G/1
1 TABLET ORAL EVERY 6 HOURS SCHEDULED
Status: DISCONTINUED | OUTPATIENT
Start: 2021-07-02 | End: 2021-07-02 | Stop reason: HOSPADM

## 2021-07-02 RX ORDER — NICOTINE 21 MG/24HR
1 PATCH, TRANSDERMAL 24 HOURS TRANSDERMAL DAILY
Status: DISCONTINUED | OUTPATIENT
Start: 2021-07-02 | End: 2021-07-02 | Stop reason: HOSPADM

## 2021-07-02 RX ORDER — SUCRALFATE 1 G/1
1 TABLET ORAL 4 TIMES DAILY
Qty: 120 TABLET | Refills: 0 | Status: SHIPPED | OUTPATIENT
Start: 2021-07-02

## 2021-07-02 RX ADMIN — SODIUM CHLORIDE: 9 INJECTION, SOLUTION INTRAVENOUS at 01:42

## 2021-07-02 RX ADMIN — POTASSIUM CHLORIDE 40 MEQ: 1500 TABLET, EXTENDED RELEASE ORAL at 10:56

## 2021-07-02 RX ADMIN — PANTOPRAZOLE SODIUM 40 MG: 40 TABLET, DELAYED RELEASE ORAL at 10:56

## 2021-07-02 RX ADMIN — SUCRALFATE 1 G: 1 TABLET ORAL at 11:59

## 2021-07-02 ASSESSMENT — PAIN SCALES - GENERAL: PAINLEVEL_OUTOF10: 0

## 2021-07-02 NOTE — PROGRESS NOTES
Occupational Therapy  Orders received. Chart reviewed. Pt up independently in room. Pt with no concerns returning home at this time. Pt with no ongoing OT needs. Will sign off at this time.      Anthony Vo, OTR/L

## 2021-07-02 NOTE — PROGRESS NOTES
Comprehensive Nutrition Assessment    Type and Reason for Visit:  Positive Nutrition Screen (MST2)    Nutrition Recommendations/Plan:   Continue Regular diet. Start Ensure BID until PO intakes improve. Nutrition Assessment:  +nutrition screen. Pt with PMH of heroin abuse presented with emesis x 2 days. Pt found to have DESTINEE and possibly withdrawing. Pt with limited wt hx in EMR, -6# over the last 4 months. Wt fairly stable. Will trial ONS until PO intakes improve and nausea improves. Malnutrition Assessment:  Malnutrition Status: At risk for malnutrition     Context:  Acute Illness     Findings of the 6 clinical characteristics of malnutrition:  Energy Intake:  Mild decrease in energy intake  Weight Loss:  No significant weight loss     Body Fat Loss:        Muscle Mass Loss:  No significant muscle mass loss    Fluid Accumulation:  No significant fluid accumulation     Strength:  Not Performed    Estimated Daily Nutrient Needs:  Energy (kcal):  3102-3996 kcal (25-30 kcal/kg CBW)  Protein (g):  74-89 gm (1-1.2 gm/kg CBW); Fluid (ml/day):  1 mL/kcal;    Nutrition Related Findings:  Na 135. K+ 3.1, phos 1.6; No BM or edema      Wounds:  None       Current Nutrition Therapies:    ADULT DIET; Regular    Anthropometric Measures:  · Height: 6' (182.9 cm)  · Current Body Weight: 164 lb (74.4 kg)   · Admission Body Weight: 157 lb (71.2 kg)    · Ideal Body Weight: 178 lbs; % Ideal Body Weight 92.1 %   · BMI: 22.2  · BMI Categories: Normal Weight (BMI 18.5-24. 9)       Nutrition Diagnosis:   · Inadequate oral intake related to  (N/V) as evidenced by poor intake prior to admission      Nutrition Interventions:   Food and/or Nutrient Delivery:  Continue Current Diet, Start Oral Nutrition Supplement  Nutrition Education/Counseling:  No recommendation at this time   Coordination of Nutrition Care:  Continue to monitor while inpatient    Goals:  consume >50% of meals and ONS during admission       Nutrition

## 2021-07-02 NOTE — PROGRESS NOTES
Patient has been resting quietly tonight. Denies pain or needs. No signs of tremors, sweats noticed. Denies N/V or anxiety.

## 2021-07-02 NOTE — PROGRESS NOTES
Office: [phone]       Fax: [phone]      Nephrology Progress Note        Patient's Name: Danielle Youngblood  Admit Date: 6/30/2021  Date of Visit: 7/2/2021    Reason for Consult:  DESTINEE    Subjective:      Danielle Youngblood is a 32 y.o. male with PMHx of polysub abuse, Hep C who was hospitalized on 6/30/2021 with complaints of weakness, body aches  Having vomiting in last few days  No diarrhea  Works as . Labs: creatinine of 9.0, BUN 77, potassium 3.8, sodium 126, calcium 10.7. Sterling Mayfield [place]field Total CK 3376,  No hematuria  Reports having kidney failure when he was taking heroin. Ur tox now + for cocaine, amphetamine  NSAID use: Yes, 1-2/wk  IV contrast: None recent   Home meds reviewed and noted to be on naprosyn    INTERVAL HISTORY    Feels better  Shortness of breath: No   UOP: Good   Creat: trending down  No nausea or vomiting       Medications:    Allergies:  Vicodin [hydrocodone-acetaminophen]    Scheduled Meds:   sodium chloride flush  5-40 mL Intravenous 2 times per day    heparin (porcine)  5,000 Units Subcutaneous 3 times per day     Continuous Infusions:   sodium chloride      sodium chloride 150 mL/hr at 07/02/21 0142       Labs:  CBC:   Recent Labs     06/30/21  2320 07/01/21  0520 07/02/21  0757   WBC 23.1* 20.6* 8.3   HGB 18.4* 14.6 13.4*   * 378 278     Ca/Mg/Phos:   Recent Labs     07/01/21  0336 07/01/21  0520 07/02/21  0757   CALCIUM 8.6 8.5 8.2*   MG 2.90* 2.90* 2.50*   PHOS  --   --  1.6*     UA:  Recent Labs     06/30/21  2350   COLORU Yellow   CLARITYU Clear   GLUCOSEU Negative   BILIRUBINUR SMALL*   KETUA TRACE*   SPECGRAV >=1.030   BLOODU MODERATE*   PHUR 5.0  5.0   PROTEINU 30*   UROBILINOGEN 0.2   NITRU Negative   LEUKOCYTESUR Negative   LABMICR YES   URINETYPE NotGiven      Urine Microscopic:   Recent Labs 06/30/21  2350   BACTERIA 3+*   WBCUA 3-5   RBCUA 5-10*   EPIU 2-5     Urine Chemistry:   Recent Labs     07/01/21  1229   NAUR 25       Objective:     Vitals: /65   Pulse 71   Temp 98 °F (36.7 °C) (Oral)   Resp 18   Ht 6' (1.829 m)   Wt 164 lb 0.4 oz (74.4 kg)   SpO2 96%   BMI 22.25 kg/m²    Wt Readings from Last 3 Encounters:   07/02/21 164 lb 0.4 oz (74.4 kg)   03/01/21 170 lb 13.7 oz (77.5 kg)   07/10/16 159 lb 13.3 oz (72.5 kg)      24HR INTAKE/OUTPUT:      Intake/Output Summary (Last 24 hours) at 7/2/2021 0853  Last data filed at 7/2/2021 5810  Gross per 24 hour   Intake 2926 ml   Output 1950 ml   Net 976 ml     Constitutional:  awake, NAD  HEENT:  MMM, No icterus  Neck: no bruits, No JVD  Cardiovascular:  reg rhythm  Respiratory: CTA, no crackles  Abdomen:  +BS, soft, NT, ND  Ext: no lower extremity edema  CNS: alert, no agitation    IMAGING:  CT ABDOMEN PELVIS WO CONTRAST Additional Contrast? None   Final Result   No acute finding in the abdomen or pelvis. Dense formed stool load suggests stasis. 1.1 cm left adrenal gland calcification likely related to remote hemorrhage. Mild concentric thickening wall of the distal esophagus. Correlate for   possible reflux disease. XR CHEST PORTABLE   Final Result   Stable chronic changes with no acute abnormality seen. Assessment :     1. DESTINEE  -Non-Oliguric  -Baseline creat: [place], now 9-->7.2  -UA: conc, prot +, bld +, RBC 5-10  -CK 3300->2700->2500  -abdominal CT scan: kidneys unremarkable. Bladder empty  -Volume: Euvolemic  -Electrolytes: Hyponatremia and No Dyskalemia  -Acid-Base: AGMA and Metabolic alkalosis    Recent Labs     06/30/21 2320 07/01/21  0336 07/01/21  0520 07/02/21  0757   BUN 77* 79* 80* 40*   CREATININE 9.0* 7.8* 7.2* 1.4*     Recent Labs     06/30/21 2320 07/01/21  0336 07/01/21  0520 07/02/21  0757   * 130* 129* 135*   K 3.8 3.5 3.7 3.1*   CO2 25 25 28 31   MG 2.90* 2.90* 2.90* 2.50*         2. HTN  -Blood pressure at goal     BP Readings from Last 1 Encounters:   07/02/21 128/65       3. Polysubstance abuse    4. Hep C    5. Hypophosphatemia      Plan:     - continue IVF. - UPC  - replace K, phos  - avoid NSAID (educated)  - get old records, if any  - Monitor BMP, recheck today    -Monitor I/O, UOP  -Maintain MAP>65  -Avoid nephrotoxin, if able. -Dose meds to current eGFR    Thank you for allowing us to participate in care of [facility] . We will continue to follow. Feel free to contact me with any questions.       Gita Grullon MD  7/2/2021    [facility] [address] [place] [address] S  Office : [phone]  Fax :[phone]

## 2021-07-02 NOTE — PROGRESS NOTES
Physical Therapy    Facility/Department: [address] PROGRESSIVE CARE  Initial Assessment/Discharge Summary    NAME: Pete Samuels  : 1995  MRN: [id number]    Date of Service: 2021    Discharge Recommendations:  Home independently   PT Equipment Recommendations  Equipment Needed: No    Assessment   Assessment: 31 y/o male admit 2021 with Acute Renal Insufficiency, Dehydration. Labs : +Cocaine. PMH as noted including ETOH Abuse, Drug Abuse. PTA pt living with sign other in multi level home; independent daily care and functional mobility. At this time, no further PT indicated. Prognosis: Good  Decision Making: Low Complexity  History: 31 y/o male admit 2021 with Acute Renal Insufficiency, Dehydration. Labs : +Cocaine. PMH as noted including ETOH Abuse, Drug Abuse. Exam: See above. Clinical Presentation: See above. Patient Education: Role of PT, POC, Need to call for assist.  Barriers to Learning: None. REQUIRES PT FOLLOW UP: No  Activity Tolerance  Activity Tolerance: Patient Tolerated treatment well       Patient Diagnosis(es): The primary encounter diagnosis was Acute renal insufficiency. Diagnoses of Dehydration and Non-intractable vomiting with nausea, unspecified vomiting type were also pertinent to this visit. has a past medical history of Alcohol abuse, Drug abuse ([facility] [address] and Hep C w/o coma, chronic ([facility] [address] has no past surgical history on file. Restrictions  Restrictions/Precautions  Restrictions/Precautions: Up as Tolerated  Vision/Hearing  Vision: Within Functional Limits  Hearing: Within functional limits     Subjective  General  Chart Reviewed: Yes  Patient assessed for rehabilitation services?: Yes  Additional Pertinent Hx: 31 y/o male admit 2021 with Acute Renal Insufficiency, Dehydration. Labs : +Cocaine. PMH as noted including ETOH Abuse, Drug Abuse.   Family / Caregiver Present: No  Referring Practitioner: Dr. Trevor Watson: Within Functional Limits  Subjective  Subjective: Pt agreeable to PT Eval/Rx. Orientation  Orientation  Overall Orientation Status: Within Functional Limits  Social/Functional History  Social/Functional History  Lives With: Significant other  Type of Home: House  Home Layout: Multi-level, Bed/Bath upstairs, 1/2 bath on main level, Laundry in basement  Home Access: Stairs to enter with rails  Bathroom Shower/Tub: Tub/Shower unit  Bathroom Toilet: Standard  Bathroom Accessibility: Accessible  Home Equipment:  (No DME pta.)  ADL Assistance: Independent  Homemaking Assistance:  (Shared with sign other.)  Ambulation Assistance: Independent  Transfer Assistance: Independent  Active : Yes  Type of occupation: Works : Construction. Cognition   Cognition  Overall Cognitive Status: WFL    Objective          AROM RLE (degrees)  RLE AROM: WFL  AROM LLE (degrees)  LLE AROM : WFL  AROM RUE (degrees)  RUE AROM : WFL  AROM LUE (degrees)  LUE AROM : WFL  Strength RLE  Strength RLE: WFL  Strength LLE  Strength LLE: WFL  Strength RUE  Strength RUE: WFL  Strength LUE  Strength LUE: WFL     Sensation  Overall Sensation Status: WFL  Bed mobility  Supine to Sit: Independent  Sit to Supine: Independent  Transfers  Sit to Stand: Independent  Stand to sit: Independent  Ambulation  Ambulation?: Yes  Ambulation 1  Surface: level tile  Device: No Device  Distance: Pt amb ~500' without assist device Independently. No LE buckling/giving way. No LOB or gait deviations. Plan   Plan  Times per week: D/C Acute Care PT Services.   Safety Devices  Type of devices: Call light within reach, Left in bed, Nurse notified      AM-PAC Score  AM-PAC Inpatient Mobility Raw Score : 24 (07/02/21 1032)  AM-PAC Inpatient T-Scale Score : 61.14 (07/02/21 1032)  Mobility Inpatient CMS 0-100% Score: 0 (07/02/21 1032)  Mobility Inpatient CMS G-Code Modifier : Psychiatric (07/02/21 1032)          Goals  Short term goals  Time Frame for Short term goals: No Acute Care PT Goals Identified. Patient Goals   Patient goals : Go home.        Therapy Time   Individual Concurrent Group Co-treatment   Time In 0945         Time Out [address]         Minutes [address] Leigh Ordaz

## 2021-07-02 NOTE — PROGRESS NOTES
Patient continues to rest quietly with no S/S of withdraw noted. Patient denies anxiety or needs. Will continue to monitor.

## 2021-07-02 NOTE — PLAN OF CARE
Problem: Falls - Risk of:  Goal: Will remain free from falls  Description: Will remain free from falls  7/2/2021 0110 by Belen Ji RN  Outcome: Ongoing  7/1/2021 1139 by Mazin Mcpherson RN  Outcome: Ongoing     Problem: Infection:  Goal: Will remain free from infection  Description: Will remain free from infection  7/2/2021 0110 by Belen Ji RN  Outcome: Ongoing  7/1/2021 1139 by Mazin Mcpherson RN  Outcome: Ongoing     Problem: Pain:  Goal: Patient's pain/discomfort is manageable  Description: Patient's pain/discomfort is manageable  7/2/2021 0110 by Belen Ji RN  Outcome: Ongoing  7/1/2021 1139 by Mazin Mcpherson RN  Outcome: Ongoing     Problem: Discharge Planning:  Goal: Patients continuum of care needs are met  Description: Patients continuum of care needs are met  7/2/2021 0110 by Belen Ji RN  Outcome: Ongoing  7/1/2021 1139 by Mazin Mcpherson RN  Outcome: Ongoing

## 2021-07-02 NOTE — DISCHARGE SUMMARY
Hospital Discharge Summary    Patient's PCP: No primary care provider on file. Admit Date: 6/30/2021   Discharge Date: 7/2/2021    Admitting Physician: Dr. Dami Lynn, DO  Discharge Physician: Dr. Toi Soriano MD   Consults: GI and nephrology    Brief HPI/hospital course:       32 y.o. male with history of drug abuse, chronic hep C presented to emergency room with nausea and vomiting for the past 3 to 4 days. .     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 .     The past few days has been having nausea and vomiting, generalized aches . [facility] He also had some right lower back pain, chest pain. . No shortness of breath, fevers or chills. No dysuria frequency. No diarrhea or abdominal pain. .. He has not been eating or drinking much. In the emergency room, vital signs showed a BP of 132/96, pulse 117, respirations 23, temperature 36.4. [facility] CT abdomen showed no acute findings, dense formed stool load suggest stasis, 1.1 cm left adrenal gland calcification likely related to remote hemorrhage, mild thickening of the distal esophagus -possible reflux     Patient however had significantly abnormal labs with a creatinine of 9.0, BUN 77, potassium 3.8, sodium 126, calcium 10.7. [facility] Total CK 3376, AST 45, ALT 64. .. Lipase 37. [facility] Urine drug screen was positive for amphetamines and cocaine, negative for opiates. . Chest x-ray was clear. .. UA showed evidence of microscopic hematuria. .     The patient was treated with aggressive fluid resuscitation. ... Renal function dramatically improved from creatinine of 9.0 to 1.4 within 24 hours. ... CK decreased to 2500 at time of discharge. --- Patient was advised to keep well-hydrated. .. Of note patient reported some pain and difficulty in swallowing food and was eval by GI. [facility] Started on Carafate and Protonix. . Suspect he had some esophagitis likely is from the intractable vomiting on admission. .. His symptoms however resolved prior to discharge  he was advised to follow-up with drug rehab, PCP and GI as needed. Invasive procedures:  none    Discharge Diagnoses:     -Acute kidney injury ([address]  -Acute nontraumatic rhabdomyolysis  -Intractable nausea and vomiting  -Polysubstance abuse including cocaine, IV heroin and IV amphetamine  -Chronic hepatitis C  -Reactive leukocytosis and thrombocytosis      Physical Exam: BP (!) 141/70   Pulse 60   Temp 97.9 °F (36.6 °C) (Oral)   Resp 16   Ht 6' (1.829 m)   Wt 164 lb 0.4 oz (74.4 kg)   SpO2 96%   BMI 22.25 kg/m²   Gen/overall appearance: Not in acute distress. Alert. Head: Normocephalic, atraumatic  Eyes: EOMI, good acuity  ENT:- Oral mucosa moist  Neck: No JVD, thyromegaly  CVS: Nml S1S2, no MRG, RRR  Pulm: Clear bilaterally. No crackles/wheezes  Gastrointestinal: Soft, NT/ND, +BS  Musculoskeletal: No edema. Warm  Neuro: No focal deficit. Moves extremity spontaneously. Psychiatry: Appropriate affect. Not agitated. Skin: Warm, dry with normal turgor. No rash        Significant Diagnostic Studies:    See above        Treatments: As above. Discharge Medications:     Medication List      START taking these medications    pantoprazole 40 MG tablet  Commonly known as: PROTONIX  Take 1 tablet by mouth every morning (before breakfast)  Start taking on: July 3, 2021     sucralfate 1 GM tablet  Commonly known as: CARAFATE  Take 1 tablet by mouth 4 times daily           Where to Get Your Medications      These medications were sent to [address] [place] [address] & [facility]  [address] [facility]    Phone: [phone]   · pantoprazole 40 MG tablet  · sucralfate 1 GM tablet         Activity: activity as tolerated  Diet: ADULT DIET;  Regular      Disposition: home  Discharged Condition: Stable  Follow Up:   Vannessa French MD  [facility]

## 2021-07-02 NOTE — CONSULTS
GASTROENTEROLOGY INPATIENT CONSULTATION        IDENTIFYING DATA/REASON FOR CONSULTATION   PATIENT:  Lay Jessica  MRN:  [id number]  ADMIT DATE: 6/30/2021  TIME OF EVALUATION: 7/2/2021 11:28 AM  HOSPITAL STAY:   LOS: 1 day     REASON FOR CONSULTATION:  Trouble swallowing and eating    HISTORY OF PRESENT ILLNESS   Lay Jessica is a 32 y.o. male with a PMH of polysubstance abuse and HCV who presented on 6/30/2021 with nausea, vomiting, fatigue. Found with significantly elevated CK, DESTINEE, hyponatremia, leukocytosis. Urine tox screen was positive for cocaine and amphetamines. We have been consulted regarding trouble swallowing. Patient reports he had intractable nausea and vomiting starting Monday. His symptoms has since improved today however he reports this morning he had trouble swallowing his breakfast.  He describes a burning pain in his esophagus along with a sensation of food getting stuck but eventually going down. He was given a dose of pantoprazole this afternoon and he now states he is able to swallow. He was able to consume a bag of Fritos. He denies seeing any blood in his vomit. He has never had an EGD procedure. He takes NSAIDs seldomly. He has a history of HCV but has never received eradication therapy. PAST MEDICAL, SURGICAL, FAMILY, and SOCIAL HISTORY     Past Medical History:   Diagnosis Date    Alcohol abuse     Drug abuse ([facility] [address]     Hep C w/o coma, chronic ([facility] [address]      History reviewed. No pertinent surgical history. History reviewed. No pertinent family history.   Social History     Socioeconomic History    Marital status: Single     Spouse name: None    Number of children: None    Years of education: None    Highest education level: None   Occupational History    None   Tobacco Use    Smoking status: Current Every Day Smoker     Packs/day: 1.00     Types: Cigarettes    Smokeless tobacco: Never Used   Substance and Sexual Activity    Alcohol use: Yes     Comment: weekly, couple times, whiskey    Drug use: No     Types: IV, Cocaine, Marijuana, Methamphetamines     Comment: past heroin meth crack marijuana - last drug use february 2016    Sexual activity: Yes     Partners: Female   Other Topics Concern    None   Social History Narrative    None     Social Determinants of Health     Financial Resource Strain:     Difficulty of Paying Living Expenses:    Food Insecurity:     Worried About Running Out of Food in the Last Year:     [address] in the Last Year:    Transportation Needs:     Lack of Transportation (Medical):      Lack of Transportation (Non-Medical):    Physical Activity:     Days of Exercise per Week:     Minutes of Exercise per Session:    Stress:     Feeling of Stress :    Social Connections:     Frequency of Communication with Friends and Family:     Frequency of Social Gatherings with Friends and Family:     Attends [employer] Services:     Active Member of Clubs or Organizations:     Attends Club or Organization Meetings:     Marital Status:    Intimate Partner Violence:     Fear of Current or Ex-Partner:     Emotionally Abused:     Physically Abused:     Sexually Abused:        MEDICATIONS   SCHEDULED:  pantoprazole, 40 mg, QAM AC  nicotine, 1 patch, Daily  sodium chloride flush, 5-40 mL, 2 times per day  heparin (porcine), 5,000 Units, 3 times per day      FLUIDS/DRIPS:     sodium chloride      sodium chloride 100 mL/hr at 07/02/21 0903     PRNs: sodium chloride flush, 5-40 mL, PRN  sodium chloride, 25 mL, PRN  ondansetron, 4 mg, Q6H PRN      ALLERGIES:  He   Allergies   Allergen Reactions    Vicodin [Hydrocodone-Acetaminophen]        REVIEW OF SYSTEMS   Pertinent ROS noted in HPI    PHYSICAL EXAM     Vitals:    07/02/21 0448 07/02/21 0807 07/02/21 1119 07/02/21 1119   BP: 128/65   (!) 141/70   Pulse: 71   60   Resp: 18   16   Temp: 98 °F (36.7 °C)   97.9 °F (36.6 °C)   TempSrc: Oral   Oral   SpO2: 96% 96%  96%   Weight:       Height:   6' (1.829 m)        I/O last 3 completed shifts: In: 2926 [I.V.:2926]  Out: 2000 [Urine:2000]      Physical Exam:  General appearance: alert, cooperative, no distress, appears stated age  Eyes: Anicteric  Head: Normocephalic, without obvious abnormality  Lungs: clear to auscultation bilaterally, Normal Effort  Heart: regular rate and rhythm, normal S1 and S2, no murmurs or rubs  Abdomen: soft, non-distended, non-tender. Bowel sounds normal. No masses,  no organomegaly. Extremities: atraumatic, no cyanosis or edema  Skin: warm and dry, no jaundice  Neuro: Grossly intact, A&OX3      LABS AND IMAGING   Laboratory   Recent Labs     06/30/21 2320 07/01/21 0520 07/02/21  0757   WBC 23.1* 20.6* 8.3   HGB 18.4* 14.6 13.4*   HCT 52.7* 40.5 37.9*   MCV 78.9* 78.4* 80.1   * 378 278     Recent Labs     07/01/21 0336 07/01/21  0520 07/02/21  0757   * 129* 135*   K 3.5 3.7 3.1*   CL 81* 80* 95*   CO2 25 28 31   PHOS  --   --  1.6*   BUN 79* 80* 40*   CREATININE 7.8* 7.2* 1.4*     Recent Labs     06/30/21 2320 07/01/21 0336 07/02/21  0757   AST 64* 48* 68*   ALT 45* 34 32   BILITOT 0.9 0.6 0.7   ALKPHOS 100 75 64     Recent Labs     06/30/21  2320   LIPASE 37.0     No results for input(s): PROTIME, INR in the last 72 hours. Imaging  CT ABDOMEN PELVIS WO CONTRAST Additional Contrast? None   Final Result   No acute finding in the abdomen or pelvis. Dense formed stool load suggests stasis. 1.1 cm left adrenal gland calcification likely related to remote hemorrhage. Mild concentric thickening wall of the distal esophagus. Correlate for   possible reflux disease. XR CHEST PORTABLE   Final Result   Stable chronic changes with no acute abnormality seen. ASSESSMENT AND RECOMMENDATIONS   32 y.o. male with a PMH of polysubstance abuse and HCV who presented on 6/30/2021 with nausea, vomiting, fatigue. Found with significantly elevated CK, DESTINEE, hyponatremia, leukocytosis.   Urine tox screen was positive for cocaine and amphetamines. We have been consulted regarding trouble swallowing. IMPRESSION:  1. Dysphagia/odynophagia  -Occurring after intractable nausea and vomiting. Suspecting related to esophageal inflammation from the vomiting. His symptoms improved with PPI. He was able to tolerate a bag of Fritos this afternoon. He has never had an EGD procedure    2. Rhabdomyolysis    3. DESTINEE  -improving    4. Hx of HCV  -Screen for HIV, HBV. Check viral load. Recommend outpt follow up to discuss treatment options    RECOMMENDATIONS:    Pt ate today therefore unable to proceed with EGD this afternoon to further evaluate for esophageal pathology. Recommend continuing PPI BID. Will add carafate. Pt can follow up as an outpt for EGD procedure. Check HIV, Hep B serologies, HCV RNA. If you have any questions or need any further information, please feel free to contact our consult team.  Thank you for allowing us to participate in the care of [facility]. The note was completed using Dragon voice recognition transcription. Every effort was made to ensure accuracy; however, inadvertent transcription errors may be present despite my best efforts to edit errors.       Miguelina Perry PA-C